# Patient Record
Sex: FEMALE | Race: WHITE | NOT HISPANIC OR LATINO | Employment: UNEMPLOYED | URBAN - METROPOLITAN AREA
[De-identification: names, ages, dates, MRNs, and addresses within clinical notes are randomized per-mention and may not be internally consistent; named-entity substitution may affect disease eponyms.]

---

## 2017-05-17 ENCOUNTER — HOSPITAL ENCOUNTER (OUTPATIENT)
Facility: MEDICAL CENTER | Age: 27
End: 2017-05-17
Attending: OBSTETRICS & GYNECOLOGY | Admitting: OBSTETRICS & GYNECOLOGY
Payer: COMMERCIAL

## 2017-05-17 VITALS
BODY MASS INDEX: 20.63 KG/M2 | RESPIRATION RATE: 17 BRPM | TEMPERATURE: 98 F | WEIGHT: 120.81 LBS | HEIGHT: 64 IN | SYSTOLIC BLOOD PRESSURE: 104 MMHG | HEART RATE: 79 BPM | OXYGEN SATURATION: 96 % | DIASTOLIC BLOOD PRESSURE: 73 MMHG

## 2017-05-17 PROBLEM — Z30.2 STERILIZATION: Status: ACTIVE | Noted: 2017-05-17

## 2017-05-17 PROBLEM — Z30.2 STERILIZATION: Status: RESOLVED | Noted: 2017-05-17 | Resolved: 2017-05-17

## 2017-05-17 LAB
B-HCG FREE SERPL-ACNC: <5 G/DL
IHCGL IHCGL: NEGATIVE G/DL

## 2017-05-17 PROCEDURE — 700102 HCHG RX REV CODE 250 W/ 637 OVERRIDE(OP)

## 2017-05-17 PROCEDURE — 500025 HCHG ARMREST, CRADLE FOAM: Performed by: OBSTETRICS & GYNECOLOGY

## 2017-05-17 PROCEDURE — A6402 STERILE GAUZE <= 16 SQ IN: HCPCS | Performed by: OBSTETRICS & GYNECOLOGY

## 2017-05-17 PROCEDURE — 160041 HCHG SURGERY MINUTES - EA ADDL 1 MIN LEVEL 4: Performed by: OBSTETRICS & GYNECOLOGY

## 2017-05-17 PROCEDURE — 501838 HCHG SUTURE GENERAL: Performed by: OBSTETRICS & GYNECOLOGY

## 2017-05-17 PROCEDURE — 160035 HCHG PACU - 1ST 60 MINS PHASE I: Performed by: OBSTETRICS & GYNECOLOGY

## 2017-05-17 PROCEDURE — A9270 NON-COVERED ITEM OR SERVICE: HCPCS

## 2017-05-17 PROCEDURE — 501688 HCHG UTERINE MANIPULATOR RUMI: Performed by: OBSTETRICS & GYNECOLOGY

## 2017-05-17 PROCEDURE — A4314 CATH W/DRAINAGE 2-WAY LATEX: HCPCS | Performed by: OBSTETRICS & GYNECOLOGY

## 2017-05-17 PROCEDURE — 160002 HCHG RECOVERY MINUTES (STAT): Performed by: OBSTETRICS & GYNECOLOGY

## 2017-05-17 PROCEDURE — 160046 HCHG PACU - 1ST 60 MINS PHASE II: Performed by: OBSTETRICS & GYNECOLOGY

## 2017-05-17 PROCEDURE — 501582 HCHG TROCAR, THRD BLADED: Performed by: OBSTETRICS & GYNECOLOGY

## 2017-05-17 PROCEDURE — 500002 HCHG ADHESIVE, DERMABOND: Performed by: OBSTETRICS & GYNECOLOGY

## 2017-05-17 PROCEDURE — 160047 HCHG PACU  - EA ADDL 30 MINS PHASE II: Performed by: OBSTETRICS & GYNECOLOGY

## 2017-05-17 PROCEDURE — 700102 HCHG RX REV CODE 250 W/ 637 OVERRIDE(OP): Performed by: OBSTETRICS & GYNECOLOGY

## 2017-05-17 PROCEDURE — 84702 CHORIONIC GONADOTROPIN TEST: CPT

## 2017-05-17 PROCEDURE — A9270 NON-COVERED ITEM OR SERVICE: HCPCS | Performed by: OBSTETRICS & GYNECOLOGY

## 2017-05-17 PROCEDURE — 500868 HCHG NEEDLE, SURGI(VARES): Performed by: OBSTETRICS & GYNECOLOGY

## 2017-05-17 PROCEDURE — 160048 HCHG OR STATISTICAL LEVEL 1-5: Performed by: OBSTETRICS & GYNECOLOGY

## 2017-05-17 PROCEDURE — 500886 HCHG PACK, LAPAROSCOPY: Performed by: OBSTETRICS & GYNECOLOGY

## 2017-05-17 PROCEDURE — 700111 HCHG RX REV CODE 636 W/ 250 OVERRIDE (IP)

## 2017-05-17 PROCEDURE — 700101 HCHG RX REV CODE 250

## 2017-05-17 PROCEDURE — 502240 HCHG MISC OR SUPPLY RC 0272: Performed by: OBSTETRICS & GYNECOLOGY

## 2017-05-17 PROCEDURE — 160029 HCHG SURGERY MINUTES - 1ST 30 MINS LEVEL 4: Performed by: OBSTETRICS & GYNECOLOGY

## 2017-05-17 PROCEDURE — 160025 RECOVERY II MINUTES (STATS): Performed by: OBSTETRICS & GYNECOLOGY

## 2017-05-17 PROCEDURE — 160009 HCHG ANES TIME/MIN: Performed by: OBSTETRICS & GYNECOLOGY

## 2017-05-17 PROCEDURE — 160036 HCHG PACU - EA ADDL 30 MINS PHASE I: Performed by: OBSTETRICS & GYNECOLOGY

## 2017-05-17 RX ORDER — ONDANSETRON 2 MG/ML
4 INJECTION INTRAMUSCULAR; INTRAVENOUS EVERY 6 HOURS PRN
Status: DISCONTINUED | OUTPATIENT
Start: 2017-05-17 | End: 2017-05-17 | Stop reason: HOSPADM

## 2017-05-17 RX ORDER — HALOPERIDOL 5 MG/ML
INJECTION INTRAMUSCULAR
Status: COMPLETED
Start: 2017-05-17 | End: 2017-05-17

## 2017-05-17 RX ORDER — PROMETHAZINE HYDROCHLORIDE 25 MG/1
25 SUPPOSITORY RECTAL EVERY 4 HOURS PRN
Status: DISCONTINUED | OUTPATIENT
Start: 2017-05-17 | End: 2017-05-17 | Stop reason: HOSPADM

## 2017-05-17 RX ORDER — HYDROCODONE BITARTRATE AND ACETAMINOPHEN 5; 325 MG/1; MG/1
2 TABLET ORAL EVERY 6 HOURS PRN
Status: DISCONTINUED | OUTPATIENT
Start: 2017-05-17 | End: 2017-05-17 | Stop reason: HOSPADM

## 2017-05-17 RX ORDER — SODIUM CHLORIDE, SODIUM LACTATE, POTASSIUM CHLORIDE, CALCIUM CHLORIDE 600; 310; 30; 20 MG/100ML; MG/100ML; MG/100ML; MG/100ML
INJECTION, SOLUTION INTRAVENOUS CONTINUOUS
Status: DISCONTINUED | OUTPATIENT
Start: 2017-05-17 | End: 2017-05-17 | Stop reason: HOSPADM

## 2017-05-17 RX ORDER — OXYCODONE HCL 5 MG/5 ML
SOLUTION, ORAL ORAL
Status: COMPLETED
Start: 2017-05-17 | End: 2017-05-17

## 2017-05-17 RX ORDER — SIMETHICONE 80 MG
80 TABLET,CHEWABLE ORAL EVERY 8 HOURS PRN
Status: DISCONTINUED | OUTPATIENT
Start: 2017-05-17 | End: 2017-05-17 | Stop reason: HOSPADM

## 2017-05-17 RX ORDER — ACETAMINOPHEN AND CODEINE PHOSPHATE 120; 12 MG/5ML; MG/5ML
1 SOLUTION ORAL DAILY
Status: ON HOLD | COMMUNITY
End: 2017-05-17

## 2017-05-17 RX ORDER — HYDROCODONE BITARTRATE AND ACETAMINOPHEN 5; 325 MG/1; MG/1
1 TABLET ORAL EVERY 4 HOURS PRN
Status: DISCONTINUED | OUTPATIENT
Start: 2017-05-17 | End: 2017-05-17 | Stop reason: HOSPADM

## 2017-05-17 RX ORDER — BUPIVACAINE HYDROCHLORIDE AND EPINEPHRINE 2.5; 5 MG/ML; UG/ML
INJECTION, SOLUTION EPIDURAL; INFILTRATION; INTRACAUDAL; PERINEURAL
Status: DISCONTINUED | OUTPATIENT
Start: 2017-05-17 | End: 2017-05-17 | Stop reason: HOSPADM

## 2017-05-17 RX ORDER — METOCLOPRAMIDE HYDROCHLORIDE 5 MG/ML
10 INJECTION INTRAMUSCULAR; INTRAVENOUS EVERY 4 HOURS PRN
Status: DISCONTINUED | OUTPATIENT
Start: 2017-05-17 | End: 2017-05-17 | Stop reason: HOSPADM

## 2017-05-17 RX ORDER — IBUPROFEN 200 MG
600 TABLET ORAL EVERY 6 HOURS PRN
Status: DISCONTINUED | OUTPATIENT
Start: 2017-05-17 | End: 2017-05-17 | Stop reason: HOSPADM

## 2017-05-17 RX ORDER — MIDAZOLAM HYDROCHLORIDE 1 MG/ML
INJECTION INTRAMUSCULAR; INTRAVENOUS
Status: DISCONTINUED
Start: 2017-05-17 | End: 2017-05-17 | Stop reason: HOSPADM

## 2017-05-17 RX ADMIN — OXYCODONE HYDROCHLORIDE 5 MG: 5 SOLUTION ORAL at 09:13

## 2017-05-17 RX ADMIN — HALOPERIDOL LACTATE 1 MG: 5 INJECTION, SOLUTION INTRAMUSCULAR at 11:00

## 2017-05-17 ASSESSMENT — PAIN SCALES - GENERAL
PAINLEVEL_OUTOF10: 0
PAINLEVEL_OUTOF10: 3
PAINLEVEL_OUTOF10: 3
PAINLEVEL_OUTOF10: 4
PAINLEVEL_OUTOF10: 0
PAINLEVEL_OUTOF10: 3
PAINLEVEL_OUTOF10: 0

## 2017-05-17 NOTE — DISCHARGE INSTRUCTIONS
ACTIVITY: Rest and take it easy for the first 24 hours.  A responsible adult is recommended to remain with you during that time.  It is normal to feel sleepy.  We encourage you to not do anything that requires balance, judgment or coordination.    MILD FLU-LIKE SYMPTOMS ARE NORMAL. YOU MAY EXPERIENCE GENERALIZED MUSCLE ACHES, THROAT IRRITATION, HEADACHE AND/OR SOME NAUSEA.    FOR 24 HOURS DO NOT:  Drive, operate machinery or run household appliances.  Drink beer or alcoholic beverages.   Make important decisions or sign legal documents.    POST-OPERATIVE INSTRUCTIONS from Dr Damon    The first few days after surgery are the most difficult. It is important to rest, take your pain medications regularly, stay well hydrated, get up and walk around at least four times a day, and call your doctor if you have any problems.     You may experience shoulder or neck pain for several days after your surgery if it involved laparoscopy. This is from the gas placed in your abdomen during surgery and usually within a few days this gas is reabsorbed and the pain will subside. You may use ice or heat, position changes, pain medications to help relieve this pain.     For the first 1-2 weeks, you should be on home rest. That means no driving or doing organized activities. Your goal should be to relax, read books, watch movies, nap and put your energies into healing. After two weeks, you can begin resuming your usual activities other than heavy lifting and sex. Listen to your body and don't overdo it.     If you are given an incentive spirometer in the hospital, take it home with you and use it every hour while awake for seven days. This will help to keep your lungs expanded and decrease your chance of getting pneumonia post-op.     You may shower. After showering, pat incisions dry with a clean towel. Do not rub. If steri-strips are covering the incisions, do not remove for at least 1 week. If glue is on the incisions, do not peel  it off until after 1-2 weeks. Within the first few days to a week, you may note some leaking from the incision, bruising, or suture material - this is O.K.     You may eat and drink whatever you feel like. If you don't have much of an appetite, try to at least stay well hydrated. Several small meals a day may work better at first. Be sure to eat something before taking your pain medicines, as this will decrease the chance of nausea.     Pain medication and decreased activity may cause constipation. Please start taking a stool softener when you get home from surgery. When you stop taking your pain medications and resume more normal activities, you can stop taking the stool softeners.     You will need to follow-up with your doctor for post-op visits as recommended, usually around two and six weeks post-op.     Some vaginal discharge and bleeding can be normal depending on the type of surgery you had. This should get less with time and is usually gone by 4-6 weeks.     After surgery, the first couple days are the most difficult and then, every day should be better as long as you don't overdo it. If that is not the case, then please call your doctor's office.     Other reasons to call your doctor's office include these WARNING SIGNS:     Pain that is not relieved by pain medications   Symptoms getting worse over time instead of better   Fever over 101   Nausea and vomiting   Very heavy bleeding with clots   Not passing gas for 48 hours   No bowel movements in 4 days   Redness and swelling around incisions   Difficult or painful urination   Unexplained symptoms  **    You should CALL YOUR PHYSICIAN if you develop:  Fever greater than 101 degrees F.  Pain not relieved by medication, or persistent nausea or vomiting.  Excessive bleeding (blood soaking through dressing) or unexpected drainage from the wound.  Extreme redness or swelling around the incision site, drainage of pus or foul smelling drainage.  Inability to  urinate or empty your bladder within 8 hours.  Problems with breathing or chest pain.    You should call 911 if you develop problems with breathing or chest pain.  If you are unable to contact your doctor or surgical center, you should go to the nearest emergency room or urgent care center. Dr Damon telephone #: [phone]    If any questions arise, call your doctor.  If your doctor is not available, please feel free to call the Surgical Center at [phone].  The Center is open Monday through Friday from 7AM to 7PM.  You can also call the HEALTH HOTLINE open 24 hours/day, 7 days/week and speak to a nurse at [phone], or toll free at [phone].    A registered nurse may call you a few days after your surgery to see how you are doing after your procedure.    MEDICATIONS: Resume taking daily medication.  Take prescribed pain medication with food.  If no medication is prescribed, you may take non-aspirin pain medication if needed.  PAIN MEDICATION CAN BE VERY CONSTIPATING.  Take a stool softener or laxative such as senokot, pericolace, or milk of magnesia if needed.    Prescription given *to mom**.  Last pain medication given at *913**.    If your physician has prescribed pain medication that includes Acetaminophen (Tylenol), do not take additional Acetaminophen (Tylenol) while taking the prescribed medication.    Depression / Suicide Risk    As you are discharged from this [facility] facility, it is important to learn how to keep safe from harming yourself.    Recognize the warning signs:  · Abrupt changes in personality, positive or negative- including increase in energy   · Giving away possessions  · Change in eating patterns- significant weight changes-  positive or negative  · Change in sleeping patterns- unable to sleep or sleeping all the time   · Unwillingness or inability to communicate  · Depression  · Unusual sadness, discouragement and loneliness  · Talk of wanting to die  · Neglect of  personal appearance   · Rebelliousness- reckless behavior  · Withdrawal from people/activities they love  · Confusion- inability to concentrate     If you or a loved one observes any of these behaviors or has concerns about self-harm, here's what you can do:  · Talk about it- your feelings and reasons for harming yourself  · Remove any means that you might use to hurt yourself (examples: pills, rope, extension cords, firearm)  · Get professional help from the community (Mental Health, Substance Abuse, psychological counseling)  · Do not be alone:Call your Safe Contact- someone whom you trust who will be there for you.  · Call your local CRISIS HOTLINE 565-1545 or 814-629-8021  · Call your local Children's Mobile Crisis Response Team [place] (218) 170-2845 or www.Schoo  · Call the toll free National Suicide Prevention Hotlines   · National Suicide Prevention Lifeline 641-388-RGUL (5674)  · National Hope Line Network 800-SUICIDE (850-5632)

## 2017-05-17 NOTE — IP AVS SNAPSHOT
5/17/2017    Shaneka Sumner  [address] [place] [address]    Dear Shaneka:    Psychiatric hospital wants to ensure your discharge home is safe and you or your loved ones have had all of your questions answered regarding your care after you leave the hospital.    Below is a list of resources and contact information should you have any questions regarding your hospital stay, follow-up instructions, or active medical symptoms.    Questions or Concerns Regarding… Contact   Medical Questions Related to Your Discharge  (7 days a week, 8am-5pm) Contact a Nurse Care Coordinator   [phone]   Medical Questions Not Related to Your Discharge  (24 hours a day / 7 days a week)  Contact the Nurse Health Line   [phone]    Medications or Discharge Instructions Refer to your discharge packet   or contact your [facility] Primary Care Provider   [phone]   Follow-up Appointment(s) Schedule your appointment via [employer]   or contact Scheduling [phone]   Billing Review your statement via [employer]  or contact Billing [phone]   Medical Records Review your records via [employer]   or contact Medical Records [phone]     You may receive a telephone call within two days of discharge. This call is to make certain you understand your discharge instructions and have the opportunity to have any questions answered. You can also easily access your medical information, test results and upcoming appointments via the [employer] free online health management tool. You can learn more and sign up at [employer]. For assistance setting up your [employer] account, please call [phone].    Once again, we want to ensure your discharge home is safe and that you have a clear understanding of any next steps in your care. If you have any questions or concerns, please do not hesitate to contact us, we are here for you. Thank you for choosing [facility] for your healthcare needs.    Sincerely,    Your [facility] Healthcare Team

## 2017-05-17 NOTE — OR SURGEON
Immediate Post-Operative Note      PreOp Diagnosis: desires permanent sterilization    PostOp Diagnosis: Same    Procedure(s):  TUBAL COAGULATION LAPAROSCOPIC BILATERAL- FOR LIGATION - Wound Class: Clean Contaminated  Pelviscopy    Surgeon(s):  Cassia Damon M.D.    Anesthesiologist/Type of Anesthesia:  Anesthesiologist: Renny Monsivais M.D./General    Surgical Staff:  Assistant: Emir Wick R.N.  Circulator: Huyen Pfeiffer R.N.  Scrub Person: Znader Jin; Lea Winters    Specimen: None    Estimated Blood Loss: Min    Findings: Uterus, tubes ovaries all normal, 1/3 of tube coagulated bilaterally and cut, grossly normal abdomen and pelvis, no scar tissue noted    Complications: None        5/17/2017 8:50 AM Cassia Damon

## 2017-05-17 NOTE — IP AVS SNAPSHOT
" Home Care Instructions                                                                                                                Name:Shaneka Sumner  Medical Record Number:[id number]  CSN: [id number]    YOB: 1990   Age: 26 y.o.  Sex: female  HT:1.613 m (5' 3.5\") WT: 54.8 kg (120 lb 13 oz)          Admit Date: 5/17/2017     Discharge Date:   Today's Date: 5/17/2017  Attending Doctor:  Cassia Damon M.D.                  Allergies:  Review of patient's allergies indicates no known allergies.                Discharge Instructions         ACTIVITY: Rest and take it easy for the first 24 hours.  A responsible adult is recommended to remain with you during that time.  It is normal to feel sleepy.  We encourage you to not do anything that requires balance, judgment or coordination.    MILD FLU-LIKE SYMPTOMS ARE NORMAL. YOU MAY EXPERIENCE GENERALIZED MUSCLE ACHES, THROAT IRRITATION, HEADACHE AND/OR SOME NAUSEA.    FOR 24 HOURS DO NOT:  Drive, operate machinery or run household appliances.  Drink beer or alcoholic beverages.   Make important decisions or sign legal documents.    POST-OPERATIVE INSTRUCTIONS from Dr Damon    The first few days after surgery are the most difficult. It is important to rest, take your pain medications regularly, stay well hydrated, get up and walk around at least four times a day, and call your doctor if you have any problems.     You may experience shoulder or neck pain for several days after your surgery if it involved laparoscopy. This is from the gas placed in your abdomen during surgery and usually within a few days this gas is reabsorbed and the pain will subside. You may use ice or heat, position changes, pain medications to help relieve this pain.     For the first 1-2 weeks, you should be on home rest. That means no driving or doing organized activities. Your goal should be to relax, read books, watch movies, nap and put your energies into healing. After two weeks, " you can begin resuming your usual activities other than heavy lifting and sex. Listen to your body and don't overdo it.     If you are given an incentive spirometer in the hospital, take it home with you and use it every hour while awake for seven days. This will help to keep your lungs expanded and decrease your chance of getting pneumonia post-op.     You may shower. After showering, pat incisions dry with a clean towel. Do not rub. If steri-strips are covering the incisions, do not remove for at least 1 week. If glue is on the incisions, do not peel it off until after 1-2 weeks. Within the first few days to a week, you may note some leaking from the incision, bruising, or suture material - this is O.K.     You may eat and drink whatever you feel like. If you don't have much of an appetite, try to at least stay well hydrated. Several small meals a day may work better at first. Be sure to eat something before taking your pain medicines, as this will decrease the chance of nausea.     Pain medication and decreased activity may cause constipation. Please start taking a stool softener when you get home from surgery. When you stop taking your pain medications and resume more normal activities, you can stop taking the stool softeners.     You will need to follow-up with your doctor for post-op visits as recommended, usually around two and six weeks post-op.     Some vaginal discharge and bleeding can be normal depending on the type of surgery you had. This should get less with time and is usually gone by 4-6 weeks.     After surgery, the first couple days are the most difficult and then, every day should be better as long as you don't overdo it. If that is not the case, then please call your doctor's office.     Other reasons to call your doctor's office include these WARNING SIGNS:     Pain that is not relieved by pain medications   Symptoms getting worse over time instead of better   Fever over 101   Nausea and  vomiting   Very heavy bleeding with clots   Not passing gas for 48 hours   No bowel movements in 4 days   Redness and swelling around incisions   Difficult or painful urination   Unexplained symptoms  **    You should CALL YOUR PHYSICIAN if you develop:  Fever greater than 101 degrees F.  Pain not relieved by medication, or persistent nausea or vomiting.  Excessive bleeding (blood soaking through dressing) or unexpected drainage from the wound.  Extreme redness or swelling around the incision site, drainage of pus or foul smelling drainage.  Inability to urinate or empty your bladder within 8 hours.  Problems with breathing or chest pain.    You should call 911 if you develop problems with breathing or chest pain.  If you are unable to contact your doctor or surgical center, you should go to the nearest emergency room or urgent care center. Dr Damon telephone #: [phone]    If any questions arise, call your doctor.  If your doctor is not available, please feel free to call the Surgical Center at [phone].  The Center is open Monday through Friday from 7AM to 7PM.  You can also call the [employer] HOTLINE open 24 hours/day, 7 days/week and speak to a nurse at [phone], or toll free at [phone].    A registered nurse may call you a few days after your surgery to see how you are doing after your procedure.    MEDICATIONS: Resume taking daily medication.  Take prescribed pain medication with food.  If no medication is prescribed, you may take non-aspirin pain medication if needed.  PAIN MEDICATION CAN BE VERY CONSTIPATING.  Take a stool softener or laxative such as senokot, pericolace, or milk of magnesia if needed.    Prescription given *to mom**.  Last pain medication given at *209**.    If your physician has prescribed pain medication that includes Acetaminophen (Tylenol), do not take additional Acetaminophen (Tylenol) while taking the prescribed medication.    Depression / Suicide Risk    As you  are discharged from this [facility] facility, it is important to learn how to keep safe from harming yourself.    Recognize the warning signs:  · Abrupt changes in personality, positive or negative- including increase in energy   · Giving away possessions  · Change in eating patterns- significant weight changes-  positive or negative  · Change in sleeping patterns- unable to sleep or sleeping all the time   · Unwillingness or inability to communicate  · Depression  · Unusual sadness, discouragement and loneliness  · Talk of wanting to die  · Neglect of personal appearance   · Rebelliousness- reckless behavior  · Withdrawal from people/activities they love  · Confusion- inability to concentrate     If you or a loved one observes any of these behaviors or has concerns about self-harm, here's what you can do:  · Talk about it- your feelings and reasons for harming yourself  · Remove any means that you might use to hurt yourself (examples: pills, rope, extension cords, firearm)  · Get professional help from the community (Mental Health, Substance Abuse, psychological counseling)  · Do not be alone:Call your Safe Contact- someone whom you trust who will be there for you.  · Call your local CRISIS HOTLINE 178-6444 or 777-617-1488  · Call your local Children's Mobile Crisis Response Team [place] (095) 579-7301 or www.[employer]r  · Call the toll free National Suicide Prevention Hotlines   · National Suicide Prevention Lifeline 573-276-VDIY (7867)  · National Hope Line Network 800-SUICIDE (944-0357)       Medication List      CONTINUE taking these medications        Instructions    Morning Afternoon Evening Bedtime    PRENATAL VITAMIN PO        Take 1 Tab by mouth every day.   Dose:  1 Tab                          STOP taking these medications     BRAXTON 0.35 MG tablet   Generic drug:  norethindrone                       Medication Information     Above and/or attached are the medications your physician expects  you to take upon discharge. Review all of your home medications and newly ordered medications with your doctor and/or pharmacist. Follow medication instructions as directed by your doctor and/or pharmacist. Please keep your medication list with you and share with your physician. Update the information when medications are discontinued, doses are changed, or new medications (including over-the-counter products) are added; and carry medication information at all times in the event of emergency situations.        Resources     Quit Smoking / Tobacco Use:    I understand the use of any tobacco products increases my chance of suffering from future heart disease or stroke and could cause other illnesses which may shorten my life. Quitting the use of tobacco products is the single most important thing I can do to improve my health. For further information on smoking / tobacco cessation call a Toll Free Quit Line at 1-599.925.6302 (*[employer] [place]) or 1-670.801.4402 (American Lung Association) or you can access the web based program at www.lungusa.org.    [place] Tobacco Users Help Line:  (245) 783-4362       Toll Free: 1-472.158.6504  Quit Tobacco Program Pending sale to [facility] (008)667-0744    Crisis Hotline:    [place] Crisis Hotline:  9-961-VNNXXGM or 1-770.324.5193    [place] Crisis Hotline:    1-803.609.8365 or 143-818-8993    Discharge Survey:   Thank you for choosing Pending sale to [facility]. We hope we did everything we could to make your hospital stay a pleasant one. You may be receiving a survey and we would appreciate your time and participation in answering the questions. Your input is very valuable to us in our efforts to improve our service to our patients and their families.            Signatures     My signature on this form indicates that:    1. I acknowledge receipt and understanding of these Home Care Instruction.  2. My questions regarding this information have been answered to my  satisfaction.  3. I have formulated a plan with my discharge nurse to obtain my prescribed medications for home.    __________________________________      __________________________________                   Patient Signature                                 Guardian/Responsible Adult Signature      __________________________________                 __________       ________                       Nurse Signature                                               Date                 Time

## 2017-05-17 NOTE — H&P
DATE OF OPERATION:  2017    CHIEF COMPLAINT:  Desires permanent sterilization.    HISTORY OF PRESENT ILLNESS:  Patient is a 26-year-old , status post 1    section who presents to my office desiring a tubal ligation.  She was   sent by Dr. Bennett in [place].  The patient has 3 kids, a 4-year-old, a   3-year-old and a 7-month-old.  She had  with her last baby and she is   interested in no future fertility at this time.  Risks, benefits,   alternatives and procedure were discussed with the patient in detail and she   agrees to proceed.  She is scheduled for a bilateral tubal coagulation   laparoscopically at [facility] on 2017.    PAST MEDICAL HISTORY:  None.    PAST SURGICAL HISTORY:   in .    MENSTRUAL HISTORY:  The patient underwent menarche at age 13.  She has regular   periods when not breastfeeding.    OBSTETRICAL HISTORY:  She has had 3 babies, 2 vaginally and 1 by    section.    MEDICATIONS:  Prenatal vitamins and birth control pills.    FAMILY HISTORY:  Noncontributory.    SOCIAL HISTORY:  The patient smokes 5-10 cigarettes a day on and off for 6   years.  She drinks 3-4 drinks a week.  She does not do recreational drugs.    ALLERGIES:  No known drug allergies.    PHYSICAL EXAMINATION:  VITAL SIGNS:  Blood pressure is 120/62, weight is 121, and height is 5 feet   4-1/2 inches.  HEENT:  Within normal limits.  NECK:  Supple, without lymphadenopathy or thyromegaly.  CARDIOVASCULAR:  Regular rate and rhythm.  LUNGS:  Clear to auscultation.  BACK:  No CVA tenderness.  ABDOMEN:  Soft and nontender, nondistended.  No masses are palpable.  PELVIC:  EGBUS appears normal.  Vagina appears normal.  Cervix appears normal.    Bimanual exam reveals a normal sized and positioned uterus, which is smooth   in contour and there are no adnexal masses.  EXTREMITIES:  Benign.    ASSESSMENT:  1.  A 26-year-old , status post 1  section.  2.  Bilateral tubal  coagulation secondary to no desire for future fertility.    Currently on birth control pills, knows her options.    PLAN:  The patient is scheduled for a pelviscopy with bilateral tubal   coagulation.  Risks, benefits, alternatives and procedure were discussed with   the patient in detail and she agrees to proceed.  Preoperative labs will be   obtained.  Preoperative antibiotics will be administered.  If she has any   problems or questions, she can contact my office.       ____________________________________     MD BRANDO PYLE / [facility]    DD:  05/16/2017 23:25:27  DT:  05/16/2017 23:48:29    D#:  [id number]  Job#:  [id number]

## 2017-05-19 NOTE — H&P
DATE OF OPERATION:  2017    CHIEF COMPLAINT:  Desires permanent sterilization.    HISTORY OF PRESENT ILLNESS:  Patient is a 26-year-old , status post 1    section who presents to my office desiring a tubal ligation.  She was   sent by Dr. Bennett in [place].  The patient has 3 kids, a 4-year-old, a   3-year-old and a 7-month-old.  She had  with her last baby and she is   interested in no future fertility at this time.  Risks, benefits,   alternatives and procedure were discussed with the patient in detail and she   agrees to proceed.  She is scheduled for a bilateral tubal coagulation   laparoscopically at [facility] on 2017.    PAST MEDICAL HISTORY:  None.    PAST SURGICAL HISTORY:   in .    MENSTRUAL HISTORY:  The patient underwent menarche at age 13.  She has regular   periods when not breastfeeding.    OBSTETRICAL HISTORY:  She has had 3 babies, 2 vaginally and 1 by    section.    MEDICATIONS:  Prenatal vitamins and birth control pills.    FAMILY HISTORY:  Noncontributory.    SOCIAL HISTORY:  The patient smokes 5-10 cigarettes a day on and off for 6   years.  She drinks 3-4 drinks a week.  She does not do recreational drugs.    ALLERGIES:  No known drug allergies.    PHYSICAL EXAMINATION:  VITAL SIGNS:  Blood pressure is 120/62, weight is 121, and height is 5 feet   4-1/2 inches.  HEENT:  Within normal limits.  NECK:  Supple, without lymphadenopathy or thyromegaly.  CARDIOVASCULAR:  Regular rate and rhythm.  LUNGS:  Clear to auscultation.  BACK:  No CVA tenderness.  ABDOMEN:  Soft and nontender, nondistended.  No masses are palpable.  PELVIC:  EGBUS appears normal.  Vagina appears normal.  Cervix appears normal.    Bimanual exam reveals a normal sized and positioned uterus, which is smooth   in contour and there are no adnexal masses.  EXTREMITIES:  Benign.    ASSESSMENT:  1.  A 26-year-old , status post 1  section.  2.  Bilateral tubal  coagulation secondary to no desire for future fertility.    Currently on birth control pills, knows her options.    PLAN:  The patient is scheduled for a pelviscopy with bilateral tubal   coagulation.  Risks, benefits, alternatives and procedure were discussed with   the patient in detail and she agrees to proceed.  Preoperative labs will be   obtained.  Preoperative antibiotics will be administered.  If she has any   problems or questions, she can contact my office.       ____________________________________     MD BRANDO PYLE / NTS    DD:  05/16/2017 23:25:27  DT:  05/16/2017 23:48:29    D#:  [id number]  Job#:  [id number]

## 2017-05-26 NOTE — OP REPORT
DATE OF SERVICE:  05/17/2017    DATE OF OPERATION:  05/17/2017.    PREOPERATIVE DIAGNOSIS:  Desires permanent sterilization.    POSTOPERATIVE DIAGNOSIS:  Desires permanent sterilization.    PROCEDURE:  Pelviscopy with bilateral tubal ligation.    SURGEON:  Cassia Damon MD.    ASSISTANT:  None.    ANESTHESIA:  General endotracheal.    ESTIMATED BLOOD LOSS:  Minimal.    FINDINGS AT THE TIME OF SURGERY:  Exam under anesthesia reveals a normal sized   and positioned uterus, which is smooth in contour and there are no adnexal   masses.  Laparoscopic findings confirmed the uterus to be normal size.  The   tubes and ovaries appeared normal.  There was a small amount of blood in the   posterior cul-de-sac.  I did cauterize approximately 1/3rd of the proximal   tube and cut it in the midline so was the tubes were no longer lined up.    TECHNIQUE:  The patient was taken to the operating room where general   anesthesia was placed.  She was then placed in the [place] stirrups with   excellent position, prepped and draped in normal sterile fashion.  A Sabra   manipulator was then placed without difficulty and attention was then turned   to the abdomen where 0.25% Marcaine with epinephrine was then injected at the   base of umbilicus, 1 cm incision was made with the scalpel.  Veress needle   placed.  Pneumoperitoneum created with CO2 gas.  The trocar was introduced and   the above findings were noted.  The operative scope was placed through the   port and the above findings were noted.  The Kleppinger bipolar was then   placed through the scope and the tube identified and carried out to its   fimbriated end.  The Kleppinger bipolar was then used to cauterize the tube to   complete desiccation approximately 1/3rd of the proximal tube.  Once it was   completely desiccated on each side, then _____ scissor was introduced and this   was used to cut in the middle of the cauterized area to ensure that the tube   was no longer lined  up.  Once this was accomplished, cautery was then used at   the tubal stumps and additional pictures were taken.  The pneumoperitoneum was   released.  The trocars were removed.  The umbilical fascia was reapproximated   with 0 Vicryl and skin with Dermabond.  Excellent reapproximation was   achieved.  The Sabra manipulator was then removed without difficulty.  There   was no bleeding vaginally.  The patient tolerated the procedure well and was   brought to recovery room in stable condition.       ____________________________________     CORINNA SERRATO MD    EAH / NTS    DD:  05/26/2017 11:45:51  DT:  05/26/2017 15:30:27    D#:  [id number]  Job#:  [id number]    cc: YANELY BUCK MD

## (undated) DEVICE — LACTATED RINGERS INJ 1000 ML - (14EA/CA 60CA/PF)

## (undated) DEVICE — PAD SANITARY 11IN MAXI IND WRAPPED  (12EA/PK 24PK/CA)

## (undated) DEVICE — TUBING INSUFFLATION - (10/BX)

## (undated) DEVICE — CANISTER SUCTION 3000ML MECHANICAL FILTER AUTO SHUTOFF MEDI-VAC NONSTERILE LF DISP  (40EA/CA)

## (undated) DEVICE — ELECTRODE 850 FOAM ADHESIVE - HYDROGEL RADIOTRNSPRNT (50/PK)

## (undated) DEVICE — SUTURE 4-0 VICRYL PLUS FS-2 - 27 INCH (36/BX)

## (undated) DEVICE — MASK ANESTHESIA ADULT  - (100/CA)

## (undated) DEVICE — SUCTION INSTRUMENT YANKAUER BULBOUS TIP W/O VENT (50EA/CA)

## (undated) DEVICE — NEPTUNE 4 PORT MANIFOLD - (20/PK)

## (undated) DEVICE — PACK LAPAROSCOPY - (1/CA)

## (undated) DEVICE — HEAD HOLDER JUNIOR/ADULT

## (undated) DEVICE — LEAD SET 6 DISP. EKG NIHON KOHDEN

## (undated) DEVICE — GLOVE BIOGEL SZ 6.5 SURGICAL PF LTX (50PR/BX 4BX/CA)

## (undated) DEVICE — KIT ROOM DECONTAMINATION

## (undated) DEVICE — TUBE E-T HI-LO CUFF 7.0MM (10EA/PK)

## (undated) DEVICE — GLOVE BIOGEL PI INDICATOR SZ 8.0 SURGICAL PF LF -(50/BX 4BX/CA)

## (undated) DEVICE — GLOVE BIOGEL SZ 7 SURGICAL PF LTX - (50PR/BX 4BX/CA)

## (undated) DEVICE — SENSOR SPO2 NEO LNCS ADHESIVE (20/BX) SEE USER NOTES

## (undated) DEVICE — SUTURE GENERAL

## (undated) DEVICE — KIT ANESTHESIA W/CIRCUIT & 3/LT BAG W/FILTER (20EA/CA)

## (undated) DEVICE — TRAY CATHETER FOLEY URINE METER W/STATLOCK 350ML (10EA/CA)

## (undated) DEVICE — DETERGENT RENUZYME PLUS 10 OZ PACKET (50/BX)

## (undated) DEVICE — GOWN WARMING STANDARD FLEX - (30/CA)

## (undated) DEVICE — GLOVE BIOGEL SZ 8 SURGICAL PF LTX - (50PR/BX 4BX/CA)

## (undated) DEVICE — TRAY SRGPRP PVP IOD WT PRP - (20/CA)

## (undated) DEVICE — SODIUM CHL IRRIGATION 0.9% 1000ML (12EA/CA)

## (undated) DEVICE — SUTURE 2-0 VICRYL PLUS CT-2 - 27 INCH (36/BX)

## (undated) DEVICE — ELECTRODE DUAL RETURN W/ CORD - (50/PK)

## (undated) DEVICE — DRESSING TRANSPARENT FILM TEGADERM 2.375 X 2.75"  (100EA/BX)"

## (undated) DEVICE — GLOVE, BIOGEL ECLIPSE, SZ 7.0, PF LTX (50/BX)

## (undated) DEVICE — NEEDLE INSFL 120MM 14GA VRRS - (20/BX)

## (undated) DEVICE — UTERINE MANIP RUMI 6.7X8 - (5/BX)

## (undated) DEVICE — GLOVE BIOGEL INDICATOR SZ 8.5 SURGICAL PF LTX - (50/BX 4BX/CA)

## (undated) DEVICE — PROTECTOR ULNA NERVE - (36PR/CA)

## (undated) DEVICE — GLOVE BIOGEL PI INDICATOR SZ 6.5 SURGICAL PF LF - (50/BX 4BX/CA)

## (undated) DEVICE — DERMABOND ADVANCED - (12EA/BX)

## (undated) DEVICE — GOWN SURGEONS X-LARGE - DISP. (30/CA)

## (undated) DEVICE — SET LEADWIRE 5 LEAD BEDSIDE DISPOSABLE ECG (1SET OF 5/EA)

## (undated) DEVICE — SET EXTENSION WITH 2 PORTS (48EA/CA) ***PART #2C8610 IS A SUBSTITUTE*****

## (undated) DEVICE — TUBING CLEARLINK DUO-VENT - C-FLO (48EA/CA)

## (undated) DEVICE — PAD OR TABLE DA VINCI 2IN X 20IN X 72IN - (12EA/CA)

## (undated) DEVICE — TROCAR Z THREAD 11 X 100 - BLADED (6/BX)

## (undated) DEVICE — ARMREST CRADLE FOAM - (2PR/PK 12PR/CA)